# Patient Record
Sex: FEMALE | Race: WHITE | NOT HISPANIC OR LATINO | URBAN - METROPOLITAN AREA
[De-identification: names, ages, dates, MRNs, and addresses within clinical notes are randomized per-mention and may not be internally consistent; named-entity substitution may affect disease eponyms.]

---

## 2020-08-03 ENCOUNTER — EMERGENCY (EMERGENCY)
Facility: HOSPITAL | Age: 40
LOS: 0 days | Discharge: HOME | End: 2020-08-04
Attending: EMERGENCY MEDICINE
Payer: COMMERCIAL

## 2020-08-03 VITALS — WEIGHT: 244.93 LBS | HEIGHT: 67 IN

## 2020-08-03 DIAGNOSIS — L50.9 URTICARIA, UNSPECIFIED: ICD-10-CM

## 2020-08-03 DIAGNOSIS — Z88.1 ALLERGY STATUS TO OTHER ANTIBIOTIC AGENTS STATUS: ICD-10-CM

## 2020-08-03 DIAGNOSIS — T63.441A TOXIC EFFECT OF VENOM OF BEES, ACCIDENTAL (UNINTENTIONAL), INITIAL ENCOUNTER: ICD-10-CM

## 2020-08-03 DIAGNOSIS — Z91.030 BEE ALLERGY STATUS: ICD-10-CM
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
## 2020-08-03 PROCEDURE — 96372 THER/PROPH/DIAG INJ SC/IM: CPT | Mod: XU

## 2020-08-03 PROCEDURE — 99284 EMERGENCY DEPT VISIT MOD MDM: CPT

## 2020-08-03 PROCEDURE — 99284 EMERGENCY DEPT VISIT MOD MDM: CPT | Mod: 25

## 2020-08-03 PROCEDURE — 96374 THER/PROPH/DIAG INJ IV PUSH: CPT

## 2020-08-03 PROCEDURE — 96375 TX/PRO/DX INJ NEW DRUG ADDON: CPT

## 2020-08-03 RX ORDER — FAMOTIDINE 10 MG/ML
40 INJECTION INTRAVENOUS ONCE
Refills: 0 | Status: COMPLETED | OUTPATIENT
Start: 2020-08-03 | End: 2020-08-03

## 2020-08-03 RX ORDER — EPINEPHRINE 0.3 MG/.3ML
0.3 INJECTION INTRAMUSCULAR; SUBCUTANEOUS ONCE
Refills: 0 | Status: COMPLETED | OUTPATIENT
Start: 2020-08-03 | End: 2020-08-03

## 2020-08-03 RX ORDER — DIPHENHYDRAMINE HCL 50 MG
50 CAPSULE ORAL ONCE
Refills: 0 | Status: COMPLETED | OUTPATIENT
Start: 2020-08-03 | End: 2020-08-03

## 2020-08-03 RX ORDER — SODIUM CHLORIDE 9 MG/ML
1000 INJECTION INTRAMUSCULAR; INTRAVENOUS; SUBCUTANEOUS ONCE
Refills: 0 | Status: COMPLETED | OUTPATIENT
Start: 2020-08-03 | End: 2020-08-03

## 2020-08-03 RX ADMIN — Medication 125 MILLIGRAM(S): at 22:55

## 2020-08-03 RX ADMIN — FAMOTIDINE 40 MILLIGRAM(S): 10 INJECTION INTRAVENOUS at 22:55

## 2020-08-03 RX ADMIN — SODIUM CHLORIDE 1000 MILLILITER(S): 9 INJECTION INTRAMUSCULAR; INTRAVENOUS; SUBCUTANEOUS at 22:55

## 2020-08-03 RX ADMIN — Medication 50 MILLIGRAM(S): at 22:53

## 2020-08-03 RX ADMIN — EPINEPHRINE 0.3 MILLIGRAM(S): 0.3 INJECTION INTRAMUSCULAR; SUBCUTANEOUS at 22:54

## 2020-08-03 NOTE — ED PROVIDER NOTE - OBJECTIVE STATEMENT
39 y/o female with no significant PMHx presents to the ED c/o allergic reaction. Pt reports she was in her garage when she was stung by a bee. +facial swelling, +hives. 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 Nonsmoker. No EtOH use. No drug use. No other complaints at this time. 41 y/o female with no significant PMHx presents to the ED c/o allergic reaction. Pt reports she was in her garage when she was stung by a bee. +facial swelling, +hives. 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 Nonsmoker. No EtOH use. No drug use. No other complaints at this time. No cp, sob, palpitation, abdominal pain, vaginal bleeding, rectal bleeding, melena, hematochezia. dysuria hematuria, frequency, recent exotic travel.

## 2020-08-03 NOTE — ED PROVIDER NOTE - SKIN, MLM
Skinwarm, dry and intact. No evidence of rash. Periorbital swelling on right and temporal area where pt was stung. Skin warm, dry and intact. No evidence of rash. Periorbital swelling on right and temporal area where pt was stung. No stridor. No urticaria.

## 2020-08-03 NOTE — ED ADULT TRIAGE NOTE - CHIEF COMPLAINT QUOTE
Ambulatory from home s/p bee sting to R forehead. Patient is allergic to bees but has no EPI-pens. Patient took 30 of cetrizine and a puff of her albuterol inhaler. VSS. Swelling noted to face, airway patent, no accessory muscle use.

## 2020-08-03 NOTE — ED PROVIDER NOTE - CARE PLAN
Principal Discharge DX:	Bee sting reaction  Secondary Diagnosis:	Anaphylactic reaction to bee sting Principal Discharge DX:	Bee sting reaction  Goal:	allergic reaction  Secondary Diagnosis:	Anaphylactic reaction to bee sting

## 2020-08-03 NOTE — ED ADULT NURSE NOTE - OBJECTIVE STATEMENT
Patient A&) presents to ED c/o allergic reaction.  Patient states she has an anaphylactic reaction to bee stings.  patient states she was in the garage and got stung by a bee on the side of her right eye.  patient states she developed a rash/hives, facial edema.

## 2020-08-03 NOTE — ED PROVIDER NOTE - CLINICAL SUMMARY MEDICAL DECISION MAKING FREE TEXT BOX
Luba VALLE: Allergic reaction, no acute respiratory distress, s/p epi, meds, will observe in the ED with education about allergic reactions, outpatient follow up with PMD and allergy and immunologist if improving in the ED. Signed out the care of the patient to Dr. Barraza at shift change, pending re-evaluation. Sign out is appreciated.

## 2020-08-03 NOTE — ED PROVIDER NOTE - NSFOLLOWUPINSTRUCTIONS_ED_ALL_ED_FT
use epi pen for allergic reaction  take benadryl 50mg every 6 hours for itching or swelling  take pepcid 20mg twice daily

## 2020-08-03 NOTE — ED PROVIDER NOTE - CARDIAC, MLM
Normal rate, regular rhythm.  Heart sounds S1, S2.  No murmurs, rubs or gallops. Normal rate, regular rhythm.  Heart sounds S1, S2.  No rubs or gallops. 2+ pulses in bilateral dp and radial arteries.

## 2020-08-03 NOTE — ED PROVIDER NOTE - PROGRESS NOTE DETAILS
pt doing well, no dypsnea no wheeezing, right eyelid slightly swollen, will d/c with epi pen VITALIY Freeman DO Christin VALLE: Signed out the care of the patient to Dr. Barraza, interval improvement of allergy, plan as per reassessment, likely dc if tolerating PO, improved, no other worsening signs of allergic reactions.

## 2020-08-03 NOTE — ED PROVIDER NOTE - PATIENT PORTAL LINK FT
You can access the FollowMyHealth Patient Portal offered by [facility] by registering at the following website: [email or web address]. By joining "[employer]’s FollowMyHealth portal, you will also be able to view your health information using other applications (apps) compatible with our system.

## 2020-08-04 VITALS — SYSTOLIC BLOOD PRESSURE: 133 MMHG | DIASTOLIC BLOOD PRESSURE: 83 MMHG

## 2020-08-04 RX ORDER — EPINEPHRINE 0.3 MG/.3ML
0.3 INJECTION INTRAMUSCULAR; SUBCUTANEOUS
Qty: 2 | Refills: 0
Start: 2020-08-04

## 2020-08-04 NOTE — ED ADULT NURSE REASSESSMENT NOTE - NS ED NURSE REASSESS COMMENT FT1
Patient resting comfortably in stretcher.  Patient states she is feeling much better at this time.  Continue to monitor pt.  Call bell within reach, safety maintained,

## 2020-08-11 ENCOUNTER — EMERGENCY (EMERGENCY)
Facility: HOSPITAL | Age: 40
LOS: 0 days | Discharge: HOME | End: 2020-08-11
Attending: EMERGENCY MEDICINE
Payer: COMMERCIAL

## 2020-08-11 VITALS
WEIGHT: 240.08 LBS | HEIGHT: 67 IN | HEART RATE: 87 BPM | DIASTOLIC BLOOD PRESSURE: 91 MMHG | TEMPERATURE: 98 F | SYSTOLIC BLOOD PRESSURE: 143 MMHG | OXYGEN SATURATION: 96 %

## 2020-08-11 DIAGNOSIS — S06.0X9A CONCUSSION WITH LOSS OF CONSCIOUSNESS OF UNSPECIFIED DURATION, INITIAL ENCOUNTER: ICD-10-CM

## 2020-08-11 DIAGNOSIS — Z88.1 ALLERGY STATUS TO OTHER ANTIBIOTIC AGENTS STATUS: ICD-10-CM
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
## 2020-08-11 DIAGNOSIS — S00.81XA ABRASION OF OTHER PART OF HEAD, INITIAL ENCOUNTER: ICD-10-CM
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
## 2020-08-11 DIAGNOSIS — Z91.030 BEE ALLERGY STATUS: ICD-10-CM

## 2020-08-11 PROCEDURE — 99282 EMERGENCY DEPT VISIT SF MDM: CPT

## 2020-08-11 NOTE — ED PROVIDER NOTE - PATIENT PORTAL LINK FT
You can access the FollowMyHealth Patient Portal offered by [facility] by registering at the following website: [email or web address]. By joining [employer]’s FollowMyHealth portal, you will also be able to view your health information using other applications (apps) compatible with our system.

## 2020-08-11 NOTE — ED ADULT TRIAGE NOTE - CHIEF COMPLAINT QUOTE
Amb[place] and sustaining hematoma/abrasion to R eyebrow. Patient had a normal day today, worked, no neuro deficits, but went to  to get the abrasion looked at and they suggested that she get a CT. Denies HA, dizziness, N/V.

## 2020-08-11 NOTE — ED PROVIDER NOTE - CLINICAL SUMMARY MEDICAL DECISION MAKING FREE TEXT BOX
neuro exam showing neuro: CN II - XII intact, EOMI, PERRL, no papilledema, 5/5 muscle strength x 4 extremities, no sensory deficits, 2+ dtr globally, negative babinski, no ataxic gait, normal CATY and FNT, normal romberg nml visual exam acuity nml suypprotive concussion managemnt d/w pt . in SDM model pt agrees ct head not necessary return for intractable headfache or new onset motor or sensory deficits.

## 2020-08-12 PROBLEM — Z78.9 OTHER SPECIFIED HEALTH STATUS: Chronic | Status: ACTIVE | Noted: 2020-08-08

## 2021-07-29 NOTE — ED ADULT TRIAGE NOTE - DOMESTIC TRAVEL HIGH RISK QUESTION
No Number Of Freeze-Thaw Cycles: 2 freeze-thaw cycles Consent: The patient's consent was obtained including but not limited to risks of crusting, scabbing, blistering, scarring, darker or lighter pigmentary change, recurrence, incomplete removal and infection. Post-Care Instructions: I reviewed with the patient in detail post-care instructions. Patient is to wear sunprotection, and avoid picking at any of the treated lesions. Pt may apply Vaseline to crusted or scabbing areas. Render Post-Care Instructions In Note?: yes Render Note In Bullet Format When Appropriate: No Duration Of Freeze Thaw-Cycle (Seconds): 10 Detail Level: Detailed

## 2024-05-20 ENCOUNTER — OFFICE (OUTPATIENT)
Dept: URBAN - METROPOLITAN AREA CLINIC 102 | Facility: CLINIC | Age: 44
Setting detail: OPHTHALMOLOGY
End: 2024-05-20
Payer: COMMERCIAL

## 2024-05-20 DIAGNOSIS — H01.005: ICD-10-CM

## 2024-05-20 DIAGNOSIS — H01.001: ICD-10-CM

## 2024-05-20 DIAGNOSIS — H01.002: ICD-10-CM

## 2024-05-20 DIAGNOSIS — H01.004: ICD-10-CM

## 2024-05-20 PROBLEM — H02.834 DERMATOCHALASIS; RIGHT UPPER LID, LEFT UPPER LID: Status: ACTIVE | Noted: 2024-05-20

## 2024-05-20 PROBLEM — H52.7 REFRACTIVE ERROR: Status: ACTIVE | Noted: 2024-05-20

## 2024-05-20 PROBLEM — H02.831 DERMATOCHALASIS; RIGHT UPPER LID, LEFT UPPER LID: Status: ACTIVE | Noted: 2024-05-20

## 2024-05-20 PROCEDURE — 92004 COMPRE OPH EXAM NEW PT 1/>: CPT | Performed by: OPHTHALMOLOGY

## 2024-05-20 ASSESSMENT — LID POSITION - DERMATOCHALASIS
OS_DERMATOCHALASIS: LUL T 1+
OD_DERMATOCHALASIS: RUL T 1+

## 2024-05-20 ASSESSMENT — LID EXAM ASSESSMENTS
OD_BLEPHARITIS: RLL RUL T
OS_BLEPHARITIS: LLL LUL T

## 2024-05-20 ASSESSMENT — CONFRONTATIONAL VISUAL FIELD TEST (CVF)
OS_FINDINGS: FULL
OD_FINDINGS: FULL

## 2025-04-26 NOTE — ED PROVIDER NOTE - OBJECTIVE STATEMENT
pt presents to ED post hitting bobby on slide at [employer] [facility] 1 days prior no loc no blood thinners no vision changes. sent by [facility] because pt states they said "they were closing and told me to come" no vision changes. no cp no sob no abd pain ambulating in nad normal

## 2025-06-02 ENCOUNTER — OFFICE (OUTPATIENT)
Dept: URBAN - METROPOLITAN AREA CLINIC 102 | Facility: CLINIC | Age: 45
Setting detail: OPHTHALMOLOGY
End: 2025-06-02
Payer: COMMERCIAL

## 2025-06-02 DIAGNOSIS — H01.001: ICD-10-CM

## 2025-06-02 DIAGNOSIS — H01.004: ICD-10-CM

## 2025-06-02 DIAGNOSIS — H16.221: ICD-10-CM

## 2025-06-02 DIAGNOSIS — H01.002: ICD-10-CM

## 2025-06-02 DIAGNOSIS — H01.005: ICD-10-CM

## 2025-06-02 DIAGNOSIS — H16.222: ICD-10-CM

## 2025-06-02 DIAGNOSIS — H43.393: ICD-10-CM

## 2025-06-02 PROBLEM — H16.223 DRY EYE SYNDROME K SICCA; RIGHT EYE, LEFT EYE, BOTH EYES: Status: ACTIVE | Noted: 2025-06-02

## 2025-06-02 PROCEDURE — 83861 MICROFLUID ANALY TEARS: CPT | Mod: QW,RT | Performed by: OPHTHALMOLOGY

## 2025-06-02 PROCEDURE — 83861 MICROFLUID ANALY TEARS: CPT | Mod: QW,LT | Performed by: OPHTHALMOLOGY

## 2025-06-02 PROCEDURE — 92014 COMPRE OPH EXAM EST PT 1/>: CPT | Performed by: OPHTHALMOLOGY

## 2025-06-02 PROCEDURE — 92201 OPSCPY EXTND RTA DRAW UNI/BI: CPT | Performed by: OPHTHALMOLOGY

## 2025-06-02 ASSESSMENT — REFRACTION_AUTOREFRACTION
OS_CYLINDER: -0.25
OS_AXIS: 150
OD_SPHERE: -0.25
OS_SPHERE: 0.00
OD_CYLINDER: 0.00
OD_AXIS: 000

## 2025-06-02 ASSESSMENT — TONOMETRY
OS_IOP_MMHG: 13
OD_IOP_MMHG: 15

## 2025-06-02 ASSESSMENT — KERATOMETRY
OD_AXISANGLE_DEGREES: 083
OS_AXISANGLE_DEGREES: 089
OS_K2POWER_DIOPTERS: 43.00
OD_K1POWER_DIOPTERS: 43.00
OS_K1POWER_DIOPTERS: 42.50
OD_K2POWER_DIOPTERS: 43.50
METHOD_AUTO_MANUAL: AUTO

## 2025-06-02 ASSESSMENT — VISUAL ACUITY
OD_BCVA: 20/20
OS_BCVA: 20/20

## 2025-06-02 ASSESSMENT — CONFRONTATIONAL VISUAL FIELD TEST (CVF)
OS_FINDINGS: FULL
OD_FINDINGS: FULL

## 2025-06-02 ASSESSMENT — SUPERFICIAL PUNCTATE KERATITIS (SPK)
OS_SPK: 1+
OD_SPK: 1+

## 2025-06-02 ASSESSMENT — LID POSITION - DERMATOCHALASIS
OD_DERMATOCHALASIS: RUL T 1+
OS_DERMATOCHALASIS: LUL T 1+

## 2025-06-02 ASSESSMENT — LID EXAM ASSESSMENTS
OS_BLEPHARITIS: LLL LUL T
OD_BLEPHARITIS: RLL RUL T